# Patient Record
Sex: MALE | Race: WHITE | NOT HISPANIC OR LATINO | Employment: UNEMPLOYED | URBAN - METROPOLITAN AREA
[De-identification: names, ages, dates, MRNs, and addresses within clinical notes are randomized per-mention and may not be internally consistent; named-entity substitution may affect disease eponyms.]

---

## 2022-01-08 ENCOUNTER — HOSPITAL ENCOUNTER (EMERGENCY)
Facility: HOSPITAL | Age: 52
Discharge: HOME | End: 2022-01-08
Attending: EMERGENCY MEDICINE
Payer: MEDICAID

## 2022-01-08 VITALS
HEART RATE: 78 BPM | DIASTOLIC BLOOD PRESSURE: 82 MMHG | OXYGEN SATURATION: 99 % | HEIGHT: 68 IN | WEIGHT: 206 LBS | SYSTOLIC BLOOD PRESSURE: 138 MMHG | RESPIRATION RATE: 16 BRPM | TEMPERATURE: 99 F | BODY MASS INDEX: 31.22 KG/M2

## 2022-01-08 DIAGNOSIS — Z71.89 ADVICE GIVEN ABOUT COVID-19 VIRUS INFECTION: Primary | ICD-10-CM

## 2022-01-08 PROCEDURE — U0005 INFEC AGEN DETEC AMPLI PROBE: HCPCS | Performed by: PHYSICIAN ASSISTANT

## 2022-01-08 PROCEDURE — 99283 EMERGENCY DEPT VISIT LOW MDM: CPT | Mod: 25,ER

## 2022-01-08 PROCEDURE — U0003 INFECTIOUS AGENT DETECTION BY NUCLEIC ACID (DNA OR RNA); SEVERE ACUTE RESPIRATORY SYNDROME CORONAVIRUS 2 (SARS-COV-2) (CORONAVIRUS DISEASE [COVID-19]), AMPLIFIED PROBE TECHNIQUE, MAKING USE OF HIGH THROUGHPUT TECHNOLOGIES AS DESCRIBED BY CMS-2020-01-R: HCPCS | Mod: ER | Performed by: PHYSICIAN ASSISTANT

## 2022-01-08 NOTE — Clinical Note
"Reyes Brock (Rodney) was seen and treated in our emergency department on 1/8/2022.     COVID-19 is present in our communities across the state. There is limited testing for COVID at this time, so not all patients can be tested. In this situation, your employee meets the following criteria:    Reyes Brock has met the criteria for COVID-19 testing based upon symptoms, travel, and/or potential exposure. The test has been completed and is pending results at this time. During this time the employee is not able to work and should be quarantined per the Centers for Disease Control timelines.     If you have any questions or concerns, or if I can be of further assistance, please do not hesitate to contact me.    Sincerely,             Shiloh Cabral PA-C"

## 2022-01-08 NOTE — ED PROVIDER NOTES
"Encounter Date: 1/8/2022       History     Chief Complaint   Patient presents with    diarrhea and headache     Pt states he has diarrhea and a headache for "a few days". Denies fever     HPI: Reyes Brock, a 51 y.o. male  has no past medical history on file.     He presents to the ED for evaluation diarrhea.  Attest to vomiting yesterday.  Unvaccinated for covid.  Tried OTC meds with improvement.          The history is provided by the patient.     Review of patient's allergies indicates:  No Known Allergies  No past medical history on file.  No past surgical history on file.  No family history on file.     Review of Systems   Constitutional: Negative for fever.   Respiratory: Negative for shortness of breath.    Cardiovascular: Negative for chest pain.   Gastrointestinal: Positive for diarrhea (resolved) and vomiting (resolved).   Allergic/Immunologic: Negative for immunocompromised state.   All other systems reviewed and are negative.      Physical Exam     Initial Vitals [01/08/22 1605]   BP Pulse Resp Temp SpO2   138/82 78 16 99.1 °F (37.3 °C) 99 %      MAP       --         Physical Exam    Nursing note and vitals reviewed.  Constitutional: He appears well-developed and well-nourished. He is not diaphoretic. No distress.   HENT:   Head: Normocephalic and atraumatic.   Right Ear: External ear normal.   Left Ear: External ear normal.   Eyes: Conjunctivae are normal.   Cardiovascular: Normal rate and regular rhythm.   Pulmonary/Chest: No respiratory distress.   Musculoskeletal:         General: Normal range of motion.     Neurological: He is alert and oriented to person, place, and time.   Psychiatric: He has a normal mood and affect. Thought content normal.         ED Course   Procedures  Labs Reviewed   SARS-COV-2 (COVID-19) QUALITATIVE PCR          Imaging Results    None          Medications - No data to display  Medical Decision Making:   Initial Assessment:   Concern for covid   ED Management:  Covid " pending.  Vital signs do not indicate sepsis, hypoxia nor respiratory distress, and in my professional opinion the patient is well enough for discharge home. The patient was provided with discharge instructions on self-care and how to quarantine at home. I reinforced this advice and the dangers to family and public with failure to comply. We will proceed with symptomatic treatment. The patient was also given a return to work note, if applicable. Return precautions discussed with the patient. The patient expressed understanding to my instructions.                         Clinical Impression:   Final diagnoses:  [Z71.89] Advice given about COVID-19 virus infection (Primary)          ED Disposition Condition    Discharge Stable        ED Prescriptions     None        Follow-up Information    None          Shiloh Cabral PA-C  01/08/22 [id number]

## 2022-01-09 DIAGNOSIS — U07.1 COVID-19 VIRUS DETECTED: ICD-10-CM

## 2022-01-09 LAB
SARS-COV-2 RNA RESP QL NAA+PROBE: DETECTED
SARS-COV-2- CYCLE NUMBER: 12

## 2022-11-11 ENCOUNTER — TELEPHONE (OUTPATIENT)
Dept: GASTROENTEROLOGY | Facility: CLINIC | Age: 52
End: 2022-11-11
Payer: MEDICAID

## 2022-11-11 RX ORDER — SODIUM, POTASSIUM,MAG SULFATES 17.5-3.13G
1 SOLUTION, RECONSTITUTED, ORAL ORAL DAILY
Qty: 1 KIT | Refills: 0 | Status: SHIPPED | OUTPATIENT
Start: 2022-11-11 | End: 2022-11-13

## 2022-11-11 NOTE — TELEPHONE ENCOUNTER
Referring Physician: Dr. Fely Butts                             Date: 11/11/2022    Reason for Referral: Screening      Family History of:   Colon polyp: No  Relationship/Age of Onset:       Colon cancer: No  Relationship/Age of Onset:       Patient with:   Hemoccults Done:       Iron deficient:   No      On Blood Thinner: No      Valvular heart disease/valve replacement: No      Anemia Present: no      On NSAID: No    On Adipex or phentermine:no     On Ozempic:No     Lung disease: no      Kidney disease: No     Hx of Crohn's or Ulcerative colitis:no     Hx of polyps:       Hx of colon cancer:       Previous colon evalations: First colonoscopy  When:   Where:   Pertinent symptoms:           Review of patient's allergies indicates: NKDA        Patient was scheduled for colonoscopy on  12/13/2022      with Dr. Connor at [facility].       instructions were reviewed with patient.          Prep sent to [facility] in [facility] Instructions    You are scheduled for a colonoscopy with Dr. Connor on 12/13/2022 at [facility]. Enter through the [facility] Entrance and check in at Same Day Surgery.  To ensure that your test is accurate and complete, you MUST follow these instructions listed below.  If you have any questions, please call our office at [phone].  Plan on being at the hospital for your procedure for 3-4 hours.      IF YOU HAVE ANY QUESTIONS ABOUT YOUR ARRIVAL TIME YOU CAN CALL [phone].    1.  Follow a CLEAR LIQUID DIET for the entire day before your scheduled colonoscopy.  This means no solid food the entire day starting when you wake.  You may have as much of the clear liquids as you want throughout the day.   CLEAR LIQUID DIET:      - NO DAIRY   - You can have:  Coffee with sugar (no creamer), tea, water, soda, apple or white grape juice, chicken or beef broth/bouillon (no meat, noodles, or veggies),  popsicles, , lemonade.    2.  AT 5 pm the evening before your colonoscopy,  POUR ONE (1) BOTTLE OF SUPREP INTO THE MIXING CONTAINER, PROVIDED INSIDE THE BOX.  ADD WATER TO THE LINE ON THE CONTAINER AND MIX IT WELL.  DRINK THE ENTIRE CONTAINER AND THEN DRINK TWO (2) MORE CONTAINERS OF WATER OVER THE NEXT 1 HOUR.  This is sometimes easier to drink if this solution is cold, so you can mix the solution 20 minutes ahead of time and place in the refrigerator prior to drinking.  You have to drink the solution within 30-45 minutes of mixing it.  Do NOT put this solution over ice.  It IS ok to drink with a straw.    3.  The endoscopy department will call you 1 day before your colonoscopy to tell you the exact time to arrive, AND to tell you the exact time to drink the 2nd portion of your prep (which will be FIVE HOURS BEFORE YOUR ARRIVAL TIME).  At this time given to you, POUR ONE (1) BOTTLE OF SUPREP INTO THE MIXING CONTAINER, PROVIDED INSIDE THE BOX.  ADD WATER TO THE LINE ON THE CONTAINER AND MIX IT WELL.  DRINK THE ENTIRE CONTAINER AND THEN DRINK TWO (2) MORE CONTAINERS OF WATER OVER THE NEXT 1 HOUR.  This is sometimes easier to drink if this solution is cold, so you can mix the solution 20 minutes ahead of time and place in the refrigerator prior to drinking.  You have to drink the solution within 30-45 minutes of mixing it.  Do NOT put this solution over ice.  It IS ok to drink with a straw.  Once this is complete, you may not have ANYTHING else by mouth!    4.  You must have someone with you to DRIVE YOU HOME since you will be receiving IV sedation for the colonoscopy.    5.  It is ok to take MOST of your REGULAR MEDICATIONS  in the morning of your test with a SIP of water.  THE ONLY MEDS YOU NEED TO HOLD ARE YOUR DIABETES MEDICATIONS,  SOME BLOOD PRESSURE MEDS, AND BLOOD THINNERS IF OK'D BY YOUR DOCTOR.  Do NOT have anything else to eat or drink the morning of your colonoscopy.  It is ok to brush your teeth.    6.  If you are on blood thinners THAT YOU HAVE BEEN INSTRUCTED TO HOLD BY YOUR  DOCTOR FOR THIS PROCEDURE, then do NOT take this the morning of your colonoscopy.  Do NOT stop these medications on your own, they must be approved to be held by your doctor.  Your colonoscopy can NOT be done if you are on these medications.  Examples of blood thinners include: Coumadin, Aggrenox, Plavix, Pradaxa, Reapro, Pletal, Xarelto, Ticagrelor, Brilinta, Eliquis, and high dose aspirin (325 mg).  You do not have to stop baby aspirin 81 mg.    7.  IF YOU ARE DIABETIC:  NO INSULIN OR ORAL MEDICATIONS THE MORNING OF THE COLONOSCOPY.  TAKE ONLY HALF THE DOSE OF YOUR INSULIN THE DAY BEFORE THE COLONOSCOPY.  DO NOT TAKE ANY ORAL DIABETIC MEDICATIONS THE DAY BEFORE THE COLONOSCOPY.  IF YOU ARE AN INSULIN DEPENDENT DIABETIC WITH UNSTABLE BLOOD SUGARS, NOTIFY YOUR PRIMARY CARE PHYSICIAN FOR INSTRUCTIONS.

## 2023-02-14 ENCOUNTER — HOSPITAL ENCOUNTER (EMERGENCY)
Facility: HOSPITAL | Age: 53
Discharge: HOME | End: 2023-02-14
Attending: EMERGENCY MEDICINE
Payer: MEDICAID

## 2023-02-14 VITALS
OXYGEN SATURATION: 97 % | WEIGHT: 165 LBS | HEART RATE: 78 BPM | RESPIRATION RATE: 18 BRPM | DIASTOLIC BLOOD PRESSURE: 80 MMHG | SYSTOLIC BLOOD PRESSURE: 124 MMHG | TEMPERATURE: 99 F | HEIGHT: 67 IN | BODY MASS INDEX: 25.9 KG/M2

## 2023-02-14 DIAGNOSIS — H91.93 DECREASED HEARING OF BOTH EARS: ICD-10-CM

## 2023-02-14 DIAGNOSIS — H61.22 IMPACTED CERUMEN OF LEFT EAR: Primary | ICD-10-CM

## 2023-02-14 PROCEDURE — 99283 EMERGENCY DEPT VISIT LOW MDM: CPT | Mod: ER

## 2023-02-14 RX ORDER — CIPROFLOXACIN AND DEXAMETHASONE 3; 1 MG/ML; MG/ML
4 SUSPENSION/ DROPS AURICULAR (OTIC) 2 TIMES DAILY
Qty: 7.5 ML | Refills: 0 | Status: SHIPPED | OUTPATIENT
Start: 2023-02-14 | End: 2023-02-19

## 2023-02-14 RX ORDER — CETIRIZINE HYDROCHLORIDE 10 MG/1
10 TABLET ORAL DAILY
Qty: 14 TABLET | Refills: 0 | Status: SHIPPED | OUTPATIENT
Start: 2023-02-14 | End: 2023-02-28

## 2023-02-14 RX ORDER — DOCUSATE SODIUM 50 MG/5ML
50 LIQUID ORAL DAILY
Qty: 10 ML | Refills: 0 | Status: SHIPPED | OUTPATIENT
Start: 2023-02-14

## 2023-02-15 NOTE — DISCHARGE INSTRUCTIONS

## 2023-02-15 NOTE — ED PROVIDER NOTES
Encounter Date: 2/14/2023       History     Chief Complaint   Patient presents with    Hearing Problem     Pt states about an hour ago he sneezed real hard felt a pop. Had daughter clean ears with peroxide and then with q-tips. Right ear sounds muffled left ear can't hear.      53 yo M with no reported significant PMHx p/w complaint of congestion and decreased hearing. Patient says that he has had increased congestion and allergies due to the weather changes over the past few days and does not take any medications for this.  Says that he sneezed earlier today and his ears felt itchy.  Says that later in the day his daughter was trying to help him clear out ear wax from his ear with but it did not help and so they came to the emergency department.  Denies taking any medications including aspirin or diuretics.  Denies recent history of flying.  No upper extremity numbness, tingling, weakness. No hx of ear surgeries.     The history is provided by the patient and a relative. No  was used.   Review of patient's allergies indicates:   Allergen Reactions    Strawberries [strawberry] Rash     No past medical history on file.  Past Surgical History:   Procedure Laterality Date    APPENDECTOMY      COLONOSCOPY N/A 12/16/2022    Procedure: COLONOSCOPY;  Surgeon: Mary Carmen Connor MD;  Location: [facility];  Service: Endoscopy;  Laterality: N/A;     No family history on file.  Social History     Tobacco Use    Smoking status: Every Day     Types: Cigarettes, Cigars, Vaping with nicotine    Smokeless tobacco: Never   Substance Use Topics    Alcohol use: Yes     Comment: occ    Drug use: Yes     Frequency: 7.0 times per week     Types: Marijuana     Review of Systems   HENT:  Positive for congestion and hearing loss.      Physical Exam     Initial Vitals [02/14/23 2121]   BP Pulse Resp Temp SpO2   124/80 78 18 98.7 °F (37.1 °C) 97 %      MAP       --         Physical Exam    Constitutional: He appears  well-developed and well-nourished. No distress.   HENT:   Head: Normocephalic and atraumatic.   Right Ear: External ear normal.   Left Ear: External ear normal.   R TM normal. L TM with impacted cerumen and abrasion to ear canal.    Eyes: EOM are normal.   Neck:   Normal range of motion.  Cardiovascular:  Normal rate.           Pulmonary/Chest: No respiratory distress.   Abdominal: He exhibits no distension.   Musculoskeletal:         General: Normal range of motion.      Cervical back: Normal range of motion.     Neurological: He is alert and oriented to person, place, and time. He has normal strength. No cranial nerve deficit or sensory deficit.   Stable gait.   Skin: Skin is warm and dry.   Psychiatric: He has a normal mood and affect.       ED Course   Procedures  Labs Reviewed - No data to display       Imaging Results    None          Medications - No data to display  Medical Decision Making:   History:   Old Records Summarized: other records.       <> Summary of Records: Chart review shows pt had screening colonoscopy 12/16/22.   ED Management:  51 yo M with no significant PMHx p/w hearing disturbance and congestion. No associated focal neurological deficits to indicate stroke. No mediation use including aspirin to be 2/2 medication side effect. Suspect the Q tip caused abrasion to the L TM, and can visualize impacted cerumen in that ear. Advised off-label use of colace drops followed by debrox for management, and made ENT referral. Given cipro ggts for L ear. Prescribed zyrtec for allergies.     No acute emergent medical condition has been identified. The patient appears to be low risk for an emergent medical condition is appropriate for discharge with outpatient f/u as detailed in discharge instructions for reevaluation and possible continued outpatient workup and management. I have discussed the workup with the patient, who has verbalized understanding of the plan and need for outpatient follow-up.  This  evaluation does not preclude the development of an emergent condition so in addition, I have advised the patient that they can return to the ED at any time with worsening or change of their symptoms, or with any other medical complaint.                           Clinical Impression:   Final diagnoses:  [H61.22] Impacted cerumen of left ear (Primary)  [H91.93] Decreased hearing of both ears        ED Disposition Condition    Discharge Stable          ED Prescriptions       Medication Sig Dispense Start Date End Date Auth. Provider    carbamide peroxide (DEBROX) 6.5 % otic solution Place 5 drops into the left ear as needed. 15 mL 2/14/2023 -- Kelsie Julian MD    docusate (COLACE) 50 mg/5 mL liquid Take 5 mLs (50 mg total) by mouth once daily. Drop 5 drops into the left ear canal and allow to sit for 10-15 minutes for cerumen impaction (off-label use) 10 mL 2/14/2023 -- Kelsie Julian MD    ciprofloxacin-dexAMETHasone 0.3-0.1% (CIPRODEX) 0.3-0.1 % DrpS Place 4 drops into the left ear 2 (two) times daily. for 5 days 7.5 mL 2/14/2023 2/19/2023 Kelsie Julian MD    cetirizine (ZYRTEC) 10 MG tablet Take 1 tablet (10 mg total) by mouth once daily. for 14 days 14 tablet 2/14/2023 2/28/2023 Kelsie Julian MD          Follow-up Information       Follow up With Specialties Details Why Contact [facility] - Emergency Dept Emergency Medicine  As needed, If symptoms worsen [address] [id number] [phone]    [facility] ENT Otolaryngology Schedule an appointment as soon as possible for a visit   [address] [place] [id number] [phone]             Kelsie Julian MD  02/15/23 1130